# Patient Record
Sex: FEMALE | Race: WHITE | Employment: FULL TIME | URBAN - NONMETROPOLITAN AREA
[De-identification: names, ages, dates, MRNs, and addresses within clinical notes are randomized per-mention and may not be internally consistent; named-entity substitution may affect disease eponyms.]

---

## 2022-09-29 ENCOUNTER — HOSPITAL ENCOUNTER (EMERGENCY)
Age: 47
Discharge: HOME | End: 2022-09-29
Payer: COMMERCIAL

## 2022-09-29 VITALS
RESPIRATION RATE: 16 BRPM | OXYGEN SATURATION: 97 % | BODY MASS INDEX: 41.64 KG/M2 | HEART RATE: 72 BPM | WEIGHT: 235 LBS | DIASTOLIC BLOOD PRESSURE: 71 MMHG | SYSTOLIC BLOOD PRESSURE: 129 MMHG | TEMPERATURE: 98 F | HEIGHT: 63 IN

## 2022-09-29 DIAGNOSIS — Z51.89 ENCOUNTER FOR WOUND RE-CHECK: Primary | ICD-10-CM

## 2022-09-29 PROCEDURE — 99999 PR OFFICE/OUTPT VISIT,PROCEDURE ONLY: CPT | Performed by: NURSE PRACTITIONER

## 2022-09-29 PROCEDURE — 99202 OFFICE O/P NEW SF 15 MIN: CPT

## 2022-09-29 ASSESSMENT — ENCOUNTER SYMPTOMS: COLOR CHANGE: 0

## 2022-09-29 ASSESSMENT — PAIN - FUNCTIONAL ASSESSMENT: PAIN_FUNCTIONAL_ASSESSMENT: NONE - DENIES PAIN

## 2022-09-29 NOTE — ED PROVIDER NOTES
[address]  Urgent Care Encounter       CHIEF COMPLAINT       Chief Complaint   Patient presents with    Suture / Staple Removal     Right hand       Nurses Notes reviewed and I agree except as noted in the HPI. HISTORY OF PRESENT ILLNESS   Jonathan Baker is a 49 y.o. female who presents 9 days postoperative carpal tunnel release on the right. Patient states she is here for a  and lives in [place] where the surgery was performed. She was instructed to present to urgent care for suture removal by her orthopedic surgeon. Patient denies any complications including redness, warmth, increased pain, or fevers. The history is provided by the patient. REVIEW OF SYSTEMS     Review of Systems   Constitutional:  Negative for fever. Musculoskeletal:  Negative for joint swelling. Skin:  Positive for wound (right hand/wrist sutures). Negative for color change. Neurological:  Negative for weakness and numbness. PAST MEDICAL HISTORY   No past medical history on file. SURGICALHISTORY     Patient  has a past surgical history that includes Cholecystectomy and Tonsillectomy. CURRENT MEDICATIONS       Discharge Medication List as of 2022 10:16 AM        CONTINUE these medications which have NOT CHANGED    Details   !! ibuprofen (IBU) 800 MG tablet Take 1 tablet by mouth every 6 hours as needed for Pain., Disp-30 tablet, R-0      !! ibuprofen (ADVIL;MOTRIN) 800 MG tablet Take 800 mg by mouth every 6 hours as needed. cephALEXin (KEFLEX) 500 MG capsule Take 500 mg by mouth 4 times daily. !! - Potential duplicate medications found. Please discuss with provider. ALLERGIES     Patient is has No Known Allergies. Patients   There is no immunization history on file for this patient. FAMILY HISTORY     Patient's family history includes Cancer in her father. SOCIAL HISTORY     Patient  reports that she has been smoking cigarettes.  She has been smoking an average of .5 packs per day. She does not have any smokeless tobacco history on file. She reports current alcohol use. She reports that she does not use drugs. PHYSICAL EXAM     ED TRIAGE VITALS  BP: 129/71, Temp: 98 °F (36.7 °C), Heart Rate: 72, Resp: 16, SpO2: 97 %,Estimated body mass index is 41.63 kg/m² as calculated from the following:    Height as of this encounter: 5' 3\" (1.6 m). Weight as of this encounter: 235 lb (106.6 kg). ,Patient's last menstrual period was 08/27/2013. Physical Exam  Vitals and nursing note reviewed. Constitutional:       General: She is not in acute distress. Cardiovascular:      Rate and Rhythm: Normal rate. Pulses: Normal pulses. Pulmonary:      Effort: Pulmonary effort is normal.   Skin:     General: Skin is warm and dry. Findings: Wound (right hand sutures x4 intact, no warmth, redness, or drainage) present. Neurological:      Mental Status: She is alert and oriented to person, place, and time. Motor: No weakness. DIAGNOSTIC RESULTS     Labs:No results found for this visit on 09/29/22. IMAGING:  None    EKG:  None    URGENT CARE COURSE:     Vitals:    09/29/22 1001   BP: 129/71   Pulse: 72   Resp: 16   Temp: 98 °F (36.7 °C)   TempSrc: Temporal   SpO2: 97%   Weight: 235 lb (106.6 kg)   Height: 5' 3\" (1.6 m)       Medications - No data to display       PROCEDURES:  None    FINAL IMPRESSION      1. Encounter for wound re-check      DISPOSITION/ PLAN   DISPOSITION Decision To Discharge 09/29/2022 10:14:37 AM     Patient presented carpal tunnel release postop 9 days performed in [place] wanting suture removal.  Discussed with orthopedics here typical length of sutures left after surgery is 2 to 3 weeks. Explained to patient very little risk for sutures to be left on until her follow-up appointment on Wednesday with her surgeon. No signs of infection. Due to traveling and location of sutures, recommend leaving until follow-up with her surgeon. Patient voiced understanding and was in agreement.     PATIENT REFERRED TO:  MD [address]AS[address] / [place] [id number]      DISCHARGE MEDICATIONS:  Discharge Medication List as of 9/29/2022 10:16 AM          Discharge Medication List as of 9/29/2022 10:16 AM          Discharge Medication List as of 9/29/2022 10:16 AM          RAY Briceno CNP    (Please note that portions of this note were completed with a voice recognition program. Efforts were made to edit the dictations but occasionally words are mis-transcribed.)           RAY Briceno CNP  09/29/22 1020

## 2022-09-29 NOTE — ED NOTES
Pt presents to [facility] for c/o post op suture removal. Pt states she is from [place] where she had a carpal tunnel repair on her right hand on 9/20 in [place] where she resides. She is in [place] due to a family emergency and had to cancel her post op follow up. Pt states she had a tele health visit with her ortho surgeon who is advising for the sutures to be removed while in [place] per patient.       Dwaine Richardson, LEONEL  65/[id number]/88 [phone]